# Patient Record
(demographics unavailable — no encounter records)

---

## 2025-05-02 NOTE — ASSESSMENT
[FreeTextEntry1] : 72 year old F with history of CAD s/p BETO to [facility] (last [facility] 7/30/2018 showed moderate RCA disease, moderate-severe dLCx disease which was too small for stenting), pAF s/p ablation at [facility] 2/2025 on DOAC, HTN, HLD who presents for f/u.  1) Pre-op cardiac clearance, for R breast surgery - EKG 5/2/25 showed sinus rhythm without ischemic changes - Nuclear Stress Test 6/7/24 at outside facility was normal - TTE 6/4/24 at outside facility showed normal LV and RV function without significant valvular disease - She has chronic SOB but denies chest pain. - Given her cardiac history, she is at intermediate cardiac risk for planned breast surgery - However, there is no evidence of active ACS, arrhythmia, clinical heart failure or significant valvular disease. She is medically optimized from cardiac standpoint and may proceed to planned R breast surgery without further cardiac testing - She may hold ASA 81 for 5-7 days prior to surgery and Eliquis for 2-3 days prior to surgery. She should resume both medications as soon as safe from bleeding/surgical perspective  2) CAD s/p BETO to LAD (last [facility] 7/30/2018 showed moderate RCA disease, moderate-severe dLCx disease which was too small for stenting) 3) SOB on exertion - Nuclear stress 6/7/24 at Dr. Jeffers's office showed fixed anteroapical defect, thought to be 2/2 to breast - Continue ASA 81mg daily - Continue Crestor 20mg daily - Continue Metoprolol succinate 50mg daily - Continue Ranexa 500mg BID and Imdur 30mg daily for anti-anginal effect  4) pAF s/p ablation 2/025 at [facility] - On Eliquis - On metoprolol  5) HTN - continue losartan-hctz 100-12.5mg daily  6) Follow-up, 3-4 months or sooner if symptomatic
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
